# Patient Record
Sex: MALE | Race: WHITE | Employment: UNEMPLOYED | URBAN - METROPOLITAN AREA
[De-identification: names, ages, dates, MRNs, and addresses within clinical notes are randomized per-mention and may not be internally consistent; named-entity substitution may affect disease eponyms.]

---

## 2018-04-17 ENCOUNTER — HOSPITAL ENCOUNTER (OUTPATIENT)
Dept: OTHER | Age: 43
Discharge: HOME | End: 2018-04-17
Attending: NURSE PRACTITIONER | Admitting: CLINIC/CENTER

## 2018-04-18 LAB
RAPID INFLUENZA  B AGN: NEGATIVE
RAPID INFLUENZA A AGN: NEGATIVE

## 2018-10-18 ENCOUNTER — HOSPITAL ENCOUNTER (EMERGENCY)
Age: 43
Discharge: HOME | End: 2018-10-19
Attending: EMERGENCY MEDICINE

## 2018-10-18 DIAGNOSIS — K08.89 PAIN, DENTAL: Primary | ICD-10-CM

## 2018-10-18 DIAGNOSIS — K02.9 PAIN DUE TO DENTAL CARIES: ICD-10-CM

## 2018-10-18 DIAGNOSIS — K04.7 DENTAL ABSCESS: ICD-10-CM

## 2018-10-18 PROCEDURE — 99282 EMERGENCY DEPT VISIT SF MDM: CPT

## 2018-10-18 ASSESSMENT — PAIN DESCRIPTION - ORIENTATION: ORIENTATION: RIGHT

## 2018-10-18 ASSESSMENT — PAIN DESCRIPTION - PAIN TYPE: TYPE: ACUTE PAIN

## 2018-10-18 ASSESSMENT — PAIN DESCRIPTION - LOCATION: LOCATION: FACE

## 2018-10-18 ASSESSMENT — PAIN SCALES - GENERAL: PAINLEVEL_OUTOF10: 5

## 2018-10-19 VITALS
RESPIRATION RATE: 18 BRPM | WEIGHT: 220 LBS | TEMPERATURE: 97.1 F | OXYGEN SATURATION: 98 % | HEIGHT: 76 IN | SYSTOLIC BLOOD PRESSURE: 133 MMHG | BODY MASS INDEX: 26.79 KG/M2 | DIASTOLIC BLOOD PRESSURE: 70 MMHG | HEART RATE: 74 BPM

## 2018-10-19 PROCEDURE — 6370000000 HC RX 637 (ALT 250 FOR IP): Performed by: EMERGENCY MEDICINE

## 2018-10-19 RX ORDER — TRAMADOL HYDROCHLORIDE 50 MG/1
50 TABLET ORAL ONCE
Status: COMPLETED | OUTPATIENT
Start: 2018-10-19 | End: 2018-10-19

## 2018-10-19 RX ORDER — CHLORHEXIDINE GLUCONATE 0.12 MG/ML
15 RINSE ORAL 2 TIMES DAILY
Qty: 420 ML | Refills: 0 | Status: SHIPPED | OUTPATIENT
Start: 2018-10-19 | End: 2018-11-02

## 2018-10-19 RX ORDER — TRAMADOL HYDROCHLORIDE 50 MG/1
50 TABLET ORAL EVERY 6 HOURS PRN
Qty: 15 TABLET | Refills: 0 | Status: SHIPPED | OUTPATIENT
Start: 2018-10-19 | End: 2018-10-29

## 2018-10-19 RX ORDER — AMOXICILLIN 500 MG/1
500 CAPSULE ORAL ONCE
Status: COMPLETED | OUTPATIENT
Start: 2018-10-19 | End: 2018-10-19

## 2018-10-19 RX ORDER — AMOXICILLIN 500 MG/1
500 CAPSULE ORAL 3 TIMES DAILY
Qty: 30 CAPSULE | Refills: 0 | Status: SHIPPED | OUTPATIENT
Start: 2018-10-19 | End: 2018-10-29

## 2018-10-19 RX ADMIN — TRAMADOL HYDROCHLORIDE 50 MG: 50 TABLET, FILM COATED ORAL at 00:15

## 2018-10-19 RX ADMIN — AMOXICILLIN 500 MG: 500 CAPSULE ORAL at 00:15

## 2019-03-31 ENCOUNTER — HOSPITAL ENCOUNTER (EMERGENCY)
Age: 44
Discharge: HOME | End: 2019-04-01
Attending: EMERGENCY MEDICINE

## 2019-03-31 DIAGNOSIS — F10.920 ACUTE ALCOHOLIC INTOXICATION WITHOUT COMPLICATION (HCC): Primary | ICD-10-CM

## 2019-03-31 LAB
ACETAMINOPHEN LEVEL: <5 UG/ML (ref 15–30)
ALBUMIN SERPL-MCNC: 4.7 GM/DL (ref 3.4–5)
ALCOHOL SCREEN SERUM: 0.1 %WT/VOL
ALCOHOL SCREEN SERUM: 0.27 %WT/VOL
ALP BLD-CCNC: 66 IU/L (ref 40–129)
ALT SERPL-CCNC: 14 U/L (ref 10–40)
AMPHETAMINES: NEGATIVE
ANION GAP SERPL CALCULATED.3IONS-SCNC: 16 MMOL/L (ref 4–16)
AST SERPL-CCNC: 19 IU/L (ref 15–37)
BACTERIA: NEGATIVE /HPF
BARBITURATE SCREEN URINE: NEGATIVE
BASOPHILS ABSOLUTE: 0.1 K/CU MM
BASOPHILS RELATIVE PERCENT: 0.5 % (ref 0–1)
BENZODIAZEPINE SCREEN, URINE: NEGATIVE
BILIRUB SERPL-MCNC: 0.3 MG/DL (ref 0–1)
BILIRUBIN URINE: NEGATIVE MG/DL
BLOOD, URINE: NEGATIVE
BUN BLDV-MCNC: 10 MG/DL (ref 6–23)
CALCIUM SERPL-MCNC: 8.6 MG/DL (ref 8.3–10.6)
CANNABINOID SCREEN URINE: ABNORMAL
CHLORIDE BLD-SCNC: 106 MMOL/L (ref 99–110)
CLARITY: CLEAR
CO2: 21 MMOL/L (ref 21–32)
COCAINE METABOLITE: NEGATIVE
COLOR: ABNORMAL
CREAT SERPL-MCNC: 0.9 MG/DL (ref 0.9–1.3)
DIFFERENTIAL TYPE: ABNORMAL
DOSE AMOUNT: ABNORMAL
DOSE AMOUNT: ABNORMAL
DOSE TIME: ABNORMAL
DOSE TIME: ABNORMAL
EOSINOPHILS ABSOLUTE: 0.1 K/CU MM
EOSINOPHILS RELATIVE PERCENT: 0.7 % (ref 0–3)
GFR AFRICAN AMERICAN: >60 ML/MIN/1.73M2
GFR NON-AFRICAN AMERICAN: >60 ML/MIN/1.73M2
GLUCOSE BLD-MCNC: 128 MG/DL (ref 70–99)
GLUCOSE, URINE: NEGATIVE MG/DL
HCT VFR BLD CALC: 51.3 % (ref 42–52)
HEMOGLOBIN: 17.2 GM/DL (ref 13.5–18)
IMMATURE NEUTROPHIL %: 0.3 % (ref 0–0.43)
KETONES, URINE: NEGATIVE MG/DL
LEUKOCYTE ESTERASE, URINE: NEGATIVE
LYMPHOCYTES ABSOLUTE: 3.7 K/CU MM
LYMPHOCYTES RELATIVE PERCENT: 35.4 % (ref 24–44)
MCH RBC QN AUTO: 30.2 PG (ref 27–31)
MCHC RBC AUTO-ENTMCNC: 33.5 % (ref 32–36)
MCV RBC AUTO: 90.2 FL (ref 78–100)
MONOCYTES ABSOLUTE: 0.9 K/CU MM
MONOCYTES RELATIVE PERCENT: 8.3 % (ref 0–4)
MUCUS: ABNORMAL HPF
NITRITE URINE, QUANTITATIVE: NEGATIVE
NUCLEATED RBC %: 0 %
OPIATES, URINE: NEGATIVE
OXYCODONE: ABNORMAL
PDW BLD-RTO: 13.1 % (ref 11.7–14.9)
PH, URINE: 6 (ref 5–8)
PHENCYCLIDINE, URINE: NEGATIVE
PLATELET # BLD: 275 K/CU MM (ref 140–440)
PMV BLD AUTO: 9.2 FL (ref 7.5–11.1)
POTASSIUM SERPL-SCNC: 3.4 MMOL/L (ref 3.5–5.1)
PROTEIN UA: NEGATIVE MG/DL
RBC # BLD: 5.69 M/CU MM (ref 4.6–6.2)
RBC URINE: ABNORMAL /HPF (ref 0–3)
SALICYLATE LEVEL: <0.3 MG/DL (ref 15–30)
SEGMENTED NEUTROPHILS ABSOLUTE COUNT: 5.7 K/CU MM
SEGMENTED NEUTROPHILS RELATIVE PERCENT: 54.8 % (ref 36–66)
SODIUM BLD-SCNC: 143 MMOL/L (ref 135–145)
SPECIFIC GRAVITY UA: 1.01 (ref 1–1.03)
TOTAL IMMATURE NEUTOROPHIL: 0.03 K/CU MM
TOTAL NUCLEATED RBC: 0 K/CU MM
TOTAL PROTEIN: 7.8 GM/DL (ref 6.4–8.2)
TRICHOMONAS: ABNORMAL /HPF
UROBILINOGEN, URINE: NORMAL MG/DL (ref 0.2–1)
WBC # BLD: 10.3 K/CU MM (ref 4–10.5)
WBC UA: <1 /HPF (ref 0–2)

## 2019-03-31 PROCEDURE — 80307 DRUG TEST PRSMV CHEM ANLYZR: CPT

## 2019-03-31 PROCEDURE — 85025 COMPLETE CBC W/AUTO DIFF WBC: CPT

## 2019-03-31 PROCEDURE — 96372 THER/PROPH/DIAG INJ SC/IM: CPT

## 2019-03-31 PROCEDURE — 36415 COLL VENOUS BLD VENIPUNCTURE: CPT

## 2019-03-31 PROCEDURE — 80053 COMPREHEN METABOLIC PANEL: CPT

## 2019-03-31 PROCEDURE — 6360000002 HC RX W HCPCS: Performed by: PHYSICIAN ASSISTANT

## 2019-03-31 PROCEDURE — 99285 EMERGENCY DEPT VISIT HI MDM: CPT

## 2019-03-31 PROCEDURE — G0480 DRUG TEST DEF 1-7 CLASSES: HCPCS

## 2019-03-31 PROCEDURE — 81001 URINALYSIS AUTO W/SCOPE: CPT

## 2019-03-31 RX ORDER — HALOPERIDOL 5 MG/ML
5 INJECTION INTRAMUSCULAR ONCE
Status: COMPLETED | OUTPATIENT
Start: 2019-03-31 | End: 2019-03-31

## 2019-03-31 RX ORDER — ZIPRASIDONE MESYLATE 20 MG/ML
10 INJECTION, POWDER, LYOPHILIZED, FOR SOLUTION INTRAMUSCULAR ONCE
Status: COMPLETED | OUTPATIENT
Start: 2019-03-31 | End: 2019-03-31

## 2019-03-31 RX ADMIN — HALOPERIDOL LACTATE 5 MG: 5 INJECTION, SOLUTION INTRAMUSCULAR at 16:24

## 2019-03-31 RX ADMIN — ZIPRASIDONE MESYLATE 10 MG: 20 INJECTION, POWDER, LYOPHILIZED, FOR SOLUTION INTRAMUSCULAR at 15:44

## 2019-03-31 NOTE — ED TRIAGE NOTES
Patient came into ED today for suicidal ideation. Patient's wife left and patient has wanted to kill himself. No plans. No weapons on him. No homicidal ideation. Patient has been drinking for more than 24 hours. Patient smokes weed, no other drug use.

## 2019-03-31 NOTE — ED PROVIDER NOTES
Emergency Department Encounter  Location: [address] [place] [address]    Patient: Dameon Gill  MRN: [id number]  : 1975  Date of evaluation: 3/31/2019  ED Provider: Nestor Couch MD    8:00p.m. Dameon Gill was checked out to me by BECKY Melton . Please see his/her initial documentation for details of the patient's initial ED presentation, physical exam and completed studies. In brief, Dameon Gill is a 38 y.o. male that presented to the emergency department  Presenting with alcohol intoxication and agitation. Reporting anxiety. Brought in by [facility]. Made threats of hurting himself and wanting to \"end it all\". Given Geodon beause he tried to leave. Alcohol repeat at 11pm. Pink slipped by PA because of suicidal complaints. Lives with teenage son and 10year old. No weapons at home.         I have reviewed and interpreted all of the currently available lab results and diagnostics from this visit:  Results for orders placed or performed during the hospital encounter of 19   Ethanol Level   Result Value Ref Range    Alcohol Scrn 0.27 (H) <0.01 %WT/VOL   CMP   Result Value Ref Range    Sodium 143 135 - 145 MMOL/L    Potassium 3.4 (L) 3.5 - 5.1 MMOL/L    Chloride 106 99 - 110 mMol/L    CO2 21 21 - 32 MMOL/L    BUN 10 6 - 23 MG/DL    CREATININE 0.9 0.9 - 1.3 MG/DL    Glucose 128 (H) 70 - 99 MG/DL    Calcium 8.6 8.3 - 10.6 MG/DL    Alb 4.7 3.4 - 5.0 GM/DL    Total Protein 7.8 6.4 - 8.2 GM/DL    Total Bilirubin 0.3 0.0 - 1.0 MG/DL    ALT 14 10 - 40 U/L    AST 19 15 - 37 IU/L    Alkaline Phosphatase 66 40 - 129 IU/L    GFR Non-African American >60 >60 mL/min/1.73m2    GFR African American >60 >60 mL/min/1.73m2    Anion Gap 16 4 - 16   CBC auto diff   Result Value Ref Range    WBC 10.3 4.0 - 10.5 K/CU MM    RBC 5.69 4.6 - 6.2 M/CU MM    Hemoglobin 17.2 13.5 - 18.0 GM/DL    Hematocrit 51.3 42 - 52 %    MCV 90.2 78 - 100 FL    MCH 30.2 27 - 31 PG    MCHC 33.5 32.0 - recognition program. Efforts were made to edit the dictations but occasionally words are mis-transcribed.)    Luna Benton MD  [address], MD  04/01/19 1011

## 2019-03-31 NOTE — ED PROVIDER NOTES
eMERGENCY dEPARTMENT eNCOUnter      PCP: No primary care provider on file. CHIEF COMPLAINT    Chief Complaint   Patient presents with    Suicidal     Patient's wife left and patient has wanted to kill himself. No plans. No weapons on him. No homicidal ideation.  Alcohol Intoxication     Patient has been drinking for more than 24 hours. Patient smokes weed, no other drug use. HPI    Iglesia Spencer is a 38 y.o. male who presents to [facility] for alcohol intoxication and suicidal ideation. Context is patient states that he has been treated steadily for the last 24 hours, admitting to both beer and hard liquor use. States that his wife left him a day ago and \"I just want to end it all. \"  I was not able to speak SPD the patient believes either his mother or sister called them given his suicidal vocalizations. Patient is currently living in the apartment with his teenage son and 10year-old son. Denies any weapons in the household. No homicidal ideations or visual/auditory hallucinations. States that he has had lifelong issues with depression and anxiety but has never been diagnosed with psychiatric condition and has never been evaluated by mental health. He does report that he has had thoughts of hurting himself in the past but does not currently have a plan. Does appear acutely intoxicated. Admits to marijuana use today but no other illicit drug use. REVIEW OF SYSTEMS    Psychiatric: See HPI. General: No fevers or chills  Cardiac:  No chest pain or palpitations  Respiratory: No difficulty breathing or new cough  Neurologic: No Headache or syncope  GI: No vomiting or diarrhea  : No dysuria or hematuria  See HPI for further details. All other systems reviewed and are negative. PAST MEDICAL & SURGICALHISTORY    History reviewed. No pertinent past medical history.   Past Surgical History:   Procedure Laterality Date    BACK SURGERY      L3 & L6       CURRENT MEDICATIONS        ALLERGIES    No Known Allergies    SOCIAL & FAMILYHISTORY    Social History     Socioeconomic History    Marital status:      Spouse name: None    Number of children: None    Years of education: None    Highest education level: None   Occupational History    None   Social Needs    Financial resource strain: None    Food insecurity:     Worry: None     Inability: None    Transportation needs:     Medical: None     Non-medical: None   Tobacco Use    Smoking status: Never Smoker    Smokeless tobacco: Current User     Types: Chew   Substance and Sexual Activity    Alcohol use: Yes     Alcohol/week: 0.0 oz     Comment: social    Drug use: No    Sexual activity: Yes   Lifestyle    Physical activity:     Days per week: None     Minutes per session: None    Stress: None   Relationships    Social connections:     Talks on phone: None     Gets together: None     Attends [employer] service: None     Active member of club or organization: None     Attends meetings of clubs or organizations: None     Relationship status: None    Intimate partner violence:     Fear of current or ex partner: None     Emotionally abused: None     Physically abused: None     Forced sexual activity: None   Other Topics Concern    None   Social History Narrative    None     History reviewed. No pertinent family history. PHYSICAL EXAM    VITAL SIGNS: /78   Pulse 92   Temp 97.9 °F (36.6 °C) (Oral)   Resp 16   SpO2 100%   Constitutional:  Well developed, well nourished, no acute distress. Patient is yelling loudly, belligerent and acutely intoxicated  Eyes:  EOMI. PERRL, conjunctiva injected  HENT:  Atraumatic, external ears normal, nose normal   Neck/Lymphatics: supple, no JVD, no swollen nodes. No thyromegaly or thyroid nodules.   Respiratory:  No respiratory distress, normal breath sounds  Cardiovascular:  Normal rate, normal rhythm, no murmurs  GI:  Soft, nondistended, normal bowel sounds, nontender  Musculoskeletal:  No edema, no acute deformities   Integument:  Well hydrated   Neurologic:  Awake alert and oriented acutely intoxicated. He was ambulatory but with an unsteady gait upon ED arrival from triage  Psychiatric:  Belligerent, yelling loudly throughout encounter, stating \"I want to end it all\" no plan. Becomes more combative and agitated when wife comes to the emergency department. No homicidal ideation. No hallucinations.   No flight of ideas but patient has frequent tangential speech       LABS:  Results for orders placed or performed during the hospital encounter of 03/31/19   Ethanol Level   Result Value Ref Range    Alcohol Scrn 0.27 (H) <0.01 %WT/VOL   CMP   Result Value Ref Range    Sodium 143 135 - 145 MMOL/L    Potassium 3.4 (L) 3.5 - 5.1 MMOL/L    Chloride 106 99 - 110 mMol/L    CO2 21 21 - 32 MMOL/L    BUN 10 6 - 23 MG/DL    CREATININE 0.9 0.9 - 1.3 MG/DL    Glucose 128 (H) 70 - 99 MG/DL    Calcium 8.6 8.3 - 10.6 MG/DL    Alb 4.7 3.4 - 5.0 GM/DL    Total Protein 7.8 6.4 - 8.2 GM/DL    Total Bilirubin 0.3 0.0 - 1.0 MG/DL    ALT 14 10 - 40 U/L    AST 19 15 - 37 IU/L    Alkaline Phosphatase 66 40 - 129 IU/L    GFR Non-African American >60 >60 mL/min/1.73m2    GFR African American >60 >60 mL/min/1.73m2    Anion Gap 16 4 - 16   CBC auto diff   Result Value Ref Range    WBC 10.3 4.0 - 10.5 K/CU MM    RBC 5.69 4.6 - 6.2 M/CU MM    Hemoglobin 17.2 13.5 - 18.0 GM/DL    Hematocrit 51.3 42 - 52 %    MCV 90.2 78 - 100 FL    MCH 30.2 27 - 31 PG    MCHC 33.5 32.0 - 36.0 %    RDW 13.1 11.7 - 14.9 %    Platelets 652 104 - 901 K/CU MM    MPV 9.2 7.5 - 11.1 FL    Differential Type AUTOMATED DIFFERENTIAL     Segs Relative 54.8 36 - 66 %    Lymphocytes % 35.4 24 - 44 %    Monocytes % 8.3 (H) 0 - 4 %    Eosinophils % 0.7 0 - 3 %    Basophils % 0.5 0 - 1 %    Segs Absolute 5.7 K/CU MM    Lymphocytes # 3.7 K/CU MM    Monocytes # 0.9 K/CU MM    Eosinophils # 0.1 K/CU MM    Basophils # 0.1 K/CU MM    Nucleated RBC % 0.0 %    Total Nucleated RBC 0.0 K/CU MM    Total Immature Neutrophil 0.03 K/CU MM    Immature Neutrophil % 0.3 0 - 0.43 %   Acetaminophen Level   Result Value Ref Range    Acetaminophen Level <5.0 (L) 15 - 30 ug/ml    DOSE AMOUNT DOSE AMT. GIVEN - UNKNOWN     DOSE TIME DOSE TIME GIVEN - UNKNOWN    Salicylate   Result Value Ref Range    Salicylate Lvl <8.7 (L) 15 - 30 MG/DL    DOSE AMOUNT DOSE AMT. GIVEN - UNKNOWN     DOSE TIME DOSE TIME GIVEN - UNKNOWN    Urinalysis   Result Value Ref Range    Color, UA STRAW (A) YELLOW    Clarity, UA CLEAR CLEAR    Glucose, Urine NEGATIVE NEGATIVE MG/DL    Bilirubin Urine NEGATIVE NEGATIVE MG/DL    Ketones, Urine NEGATIVE NEGATIVE MG/DL    Specific Gravity, UA 1.006 1.001 - 1.035    Blood, Urine NEGATIVE NEGATIVE    pH, Urine 6.0 5.0 - 8.0    Protein, UA NEGATIVE NEGATIVE MG/DL    Urobilinogen, Urine NORMAL 0.2 - 1.0 MG/DL    Nitrite Urine, Quantitative NEGATIVE NEGATIVE    Leukocyte Esterase, Urine NEGATIVE NEGATIVE    RBC, UA NONE SEEN 0 - 3 /HPF    WBC, UA <1 0 - 2 /HPF    Bacteria, UA NEGATIVE NEGATIVE /HPF    Mucus, UA RARE (A) NEGATIVE HPF    Trichomonas, UA NONE SEEN NONE SEEN /HPF   Drug screen multi urine   Result Value Ref Range    Cannabinoid Scrn, Ur UNCONFIRMED POSITIVE (A) NEGATIVE    Amphetamines NEGATIVE NEGATIVE    Cocaine Metabolite NEGATIVE NEGATIVE    Benzodiazepine Screen, Urine NEGATIVE NEGATIVE    Barbiturate Screen, Ur NEGATIVE NEGATIVE    Opiates, Urine NEGATIVE NEGATIVE    Phencyclidine, Urine NEGATIVE NEGATIVE    Oxycodone  NEGATIVE     NEGATIVE          THRESHOLD CONCENTRATIONS (mg/dL)  AMPHT               1000  LISE,OPIA             300  BZO,BAR              200  PCP                   25  THC                   50  OXY                  100          IF POSITIVE, SPECIMEN WILL BE  DISCARDED AFTER 6 MONTHS. CALL LAB IF CONFIRMATION NEEDED. ALL NEGATIVE SPECIMENS WILL BE  DISCARDED AFTER ONE WEEK. * UNCONFIRMED POSITIVES MAY  NOT MEET FORENSIC REQUIREMENTS. ED COURSE & MEDICAL DECISION MAKING    Pertinent Labs & Imaging studies reviewed and interpreted. (See chart for details)    Vitals:    03/31/19 1431 03/31/19 1612   BP: (!) 137/97 116/78   Pulse: 88 92   Resp: 16    Temp: 97.9 °F (36.6 °C)    TempSrc: Oral    SpO2: 96% 100%        Vital signs and nursing notes reviewed during ED course. Patient care and presentation staffed with supervising MD - Dr. Franchesca Hernandez. All pertinent Lab data and radiographic results reviewed with patient at bedside. The patient and/or the family were informed of the results of any tests/labs/imaging, the treatment plan, and time was allotted to answer questions. Differential diagnoses: Drug or alcohol use or abuse, psychosis, behavioral mental illness, metabolic disturbance, infection, neurologic emergency, other    Clinical  IMPRESSION    1. Acute alcoholic intoxication without complication ([facility] [address]        Patient presents from home via SPD for alcohol intoxication and suicidal ideation. On exam, patient does appear acutely intoxicated, yelling out frequently during exam.  He does report both to myself and to ED nursing staff that \"I want to end it all\" without plan. No homicidal ideation. Pink slip was filled out, signed and placed into patient's chart. Psych laboratory workup was initiated, positive for cannabinoids and alcohol level is 0.27. At this time, awaiting repeat EtOH level for medical clearance and mental health evaluation. At one point, the patient required IM geodon for combativeness and agitation but then became more combative/agitated  after his wife came to the ED and required additional haldol. At this time, he is requesting comfortably in no acute distress. I did discuss this patient's history, ED presentation/course with my attending physician - Dr. Franchesca Hernandez. Final disposition, clinical impression, interpretation of labs/imaging were made by attending physician.   Please see his/her note for additional details of their evaluation.   (Please note the MDM and HPI sections of this note were completed with a voice recognition program.  Efforts were made to edit the dictations but occasionally words are mis-transcribed.)        Yared Govea PA-C  03/31/19 1914

## 2019-03-31 NOTE — ED NOTES
Bed: ED-27  Expected date: 3/31/19  Expected time: 2:05 PM  Means of arrival:   Comments:  radha Catalan RN  03/31/19 4988

## 2019-04-01 VITALS
TEMPERATURE: 97.9 F | OXYGEN SATURATION: 93 % | HEART RATE: 86 BPM | SYSTOLIC BLOOD PRESSURE: 96 MMHG | DIASTOLIC BLOOD PRESSURE: 53 MMHG | RESPIRATION RATE: 17 BRPM

## 2019-04-01 NOTE — ED NOTES
Report from [facility]. Patient resting in bed with 1:1 sitter at bedside.      Mabel Silverman RN  03/31/19 4425

## 2023-10-20 ENCOUNTER — HOSPITAL ENCOUNTER (OUTPATIENT)
Dept: PSYCHIATRY | Age: 48
Setting detail: THERAPIES SERIES
Discharge: HOME | End: 2023-10-20
Payer: COMMERCIAL

## 2023-10-20 PROCEDURE — 80305 DRUG TEST PRSMV DIR OPT OBS: CPT

## 2023-10-20 PROCEDURE — 90791 PSYCH DIAGNOSTIC EVALUATION: CPT

## 2023-10-20 ASSESSMENT — ANXIETY QUESTIONNAIRES
GAD7 TOTAL SCORE: 3
7. FEELING AFRAID AS IF SOMETHING AWFUL MIGHT HAPPEN: 1
2. NOT BEING ABLE TO STOP OR CONTROL WORRYING: 0
IF YOU CHECKED OFF ANY PROBLEMS ON THIS QUESTIONNAIRE, HOW DIFFICULT HAVE THESE PROBLEMS MADE IT FOR YOU TO DO YOUR WORK, TAKE CARE OF THINGS AT HOME, OR GET ALONG WITH OTHER PEOPLE: NOT DIFFICULT AT ALL
6. BECOMING EASILY ANNOYED OR IRRITABLE: 0
4. TROUBLE RELAXING: 0
3. WORRYING TOO MUCH ABOUT DIFFERENT THINGS: 1
5. BEING SO RESTLESS THAT IT IS HARD TO SIT STILL: 0
1. FEELING NERVOUS, ANXIOUS, OR ON EDGE: 1

## 2023-10-20 ASSESSMENT — PATIENT HEALTH QUESTIONNAIRE - PHQ9
SUM OF ALL RESPONSES TO PHQ QUESTIONS 1-9: 1
2. FEELING DOWN, DEPRESSED OR HOPELESS: 1
1. LITTLE INTEREST OR PLEASURE IN DOING THINGS: 0
SUM OF ALL RESPONSES TO PHQ9 QUESTIONS 1 & 2: 1
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
## 2023-10-24 ENCOUNTER — HOSPITAL ENCOUNTER (OUTPATIENT)
Dept: PSYCHIATRY | Age: 48
Setting detail: THERAPIES SERIES
Discharge: HOME | End: 2023-10-24
Payer: COMMERCIAL

## 2023-10-24 PROCEDURE — 90853 GROUP PSYCHOTHERAPY: CPT

## 2023-10-25 NOTE — GROUP NOTE
[facility] Group Therapy Note      10/24/2023    Location:  [facility]      Clients Presents: [id number], [address], 1048, 1366, [id number], 1043, 1159    Clients Absent:     Length of session: 1.5 hours    Group Note: OP    Group Type: Co-Ed    New members were welcomed and introduced. Norms and expectations of group were discussed. Content: Counselor presented a topic focused discussion on ACE's. Client took ACE test, answering 10 questions (Adverse Childhood Events) to get his/her score. Client participated in group discussion surrounding different types of childhood trauma and how it effected him/her then and now. Telly Barraza, [address] [facility] [place]  10/24/2023 7:00 PM    Co-Therapist: N/A      [facility] Individual Group Progress Note    Christy Gaviria  1975  10/25/2023    Notes on Client Progress in Group  Client shared this is his first group and he feels like he is making progress in remaining sober. He has a medical marijuana card but has not used alcohol. Client participated in group discussion on ACE's, he shared he scored a 5.      Austin Brewer [address]  10/25/2023 7:42 AM    Co-Therapist: N/A

## 2023-10-27 ENCOUNTER — HOSPITAL ENCOUNTER (OUTPATIENT)
Dept: PSYCHIATRY | Age: 48
Setting detail: THERAPIES SERIES
Discharge: HOME | End: 2023-10-27
Payer: COMMERCIAL

## 2023-10-27 PROCEDURE — 90834 PSYTX W PT 45 MINUTES: CPT

## 2023-10-27 NOTE — PROGRESS NOTES
----- Message from Edouard Gibson MD sent at 11/4/2022  5:12 PM CDT -----  Patient provided necessary mobility scooter paperwork. Will need an appointment to have all required fields evaluated. Paperwork is on your desk.    Edouard Gibson MD         [facility] TREATMENT PLAN      Location: [x] [place] [] [facility]    Treatment plan: Initial    Strengths: loyal, reliable and hardworking    Weakness/Limitations: procrastination , impulsive when angry     Service/Frequency/Duration: Individual 1 time a week for 90 days, Group assigned 1 a week for 90 days, Urinalysis Random for 90 days, AA/NA 1-2 Biweekly for 90 days, and Case Management as needed for 90 days    Diagnosis: F10.20 Other and unspecified alcohol dependence/unspecified drinking behavior and F12.10 Nondependent cannabis abuse-unspecified use    Level of Care: 1 Outpatient Services      Problem: History of Substance use resulting disrupting public service. Goal: Client will enhance personalized knowledge and insight associated with mood altering substances in 90 days   Objectives:   1) Client will remind self of detrimental consequences in major life areas regarding AoD use in 90 days Evaluation Date: 1/27/2024 Code: C Continue TBD     2) Client will identify 4 to 8 benefits and gratitude's due to remaining substance free in 90 days: Evaluation Date: 1/27/2024 Code: C Continue TBD     3) Client will identify 4 relapse triggers, define relapse, difference between internal and external triggers associated with substance use in 90 days and Evaluation Date: 1/27/2024 Code: C Continue TBD     2. Problem: Low Self-Esteem/Identify issues as a result of self-medicating.       Goal: Client will learn to focus on character strengths and feel better about himself in 90 days      Objectives:   1) Client will log 3-5 times weekly thoughts and feelings and share in his individual sessions in 90 days:  Evaluation Date: 1/27/2024 Code: C Continue TBD      2) Client will explore new outdoor/indoor activities that bring him estephania in 90 days Evaluation Date: 1/27/2024 Code: C Continue TBD      3)  Utilize, if needed case management services provided by [employer] to enhance abstaining from substance use Evaluation
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
## 2023-10-31 ENCOUNTER — APPOINTMENT (OUTPATIENT)
Dept: PSYCHIATRY | Age: 48
End: 2023-10-31
Payer: COMMERCIAL

## 2023-10-31 PROCEDURE — 90853 GROUP PSYCHOTHERAPY: CPT

## 2023-11-01 NOTE — GROUP NOTE
[facility] Group Therapy Note      10/31/2023    Location:  [place]      Clients Presents: [id number], [id number], [id number], [id number], [id number]    Clients Absent: 1043, 1159    Length of session: 1.5 hours    Group Note: OP    Group Type: Co-Ed    New members were welcomed and introduced. Norms and expectations of group were discussed. Content: Counselor presented a topic focused discussion on changing behaviors. Client identified a behavior he/she wants to change. Client identified steps to take in order to make the change. Austin Chopra  10/31/2023 7:00 PM    Co-Therapist: N/A      [facility] Individual Group Progress Note    Judy Banda  1975 11/1/2023    Notes on Client Progress in Group    Client shared he is making progress on his goals and remaining sober except his medical marijuana. Client participated in group discussion on changing behaviors reporting he would like to continue to pause when he feels strong emotions and take a time out.      Austin Chopra  11/1/2023 7:48 AM    Co-Therapist: N/A

## 2023-11-03 ENCOUNTER — HOSPITAL ENCOUNTER (OUTPATIENT)
Dept: PSYCHIATRY | Age: 48
Setting detail: THERAPIES SERIES
Discharge: HOME | End: 2023-11-03
Payer: COMMERCIAL

## 2023-11-03 PROCEDURE — 90834 PSYTX W PT 45 MINUTES: CPT

## 2023-11-03 NOTE — PROGRESS NOTES
[facility] TREATMENT PLAN      Location: [x] [place] [] [facility]    Treatment plan: Initial    Strengths: loyal, reliable and hardworking    Weakness/Limitations: procrastination , impulsive when angry     Service/Frequency/Duration: Individual 1 time a week for 90 days, Group assigned 1 a week for 90 days, Urinalysis Random for 90 days, AA/NA 1-2 Biweekly for 90 days, and Case Management as needed for 90 days    Diagnosis: F10.20 Other and unspecified alcohol dependence/unspecified drinking behavior and F12.10 Nondependent cannabis abuse-unspecified use    Level of Care: 1 Outpatient Services      Problem: History of Substance use resulting disrupting public service. Goal: Client will enhance personalized knowledge and insight associated with mood altering substances in 90 days   Objectives:   1) Client will remind self of detrimental consequences in major life areas regarding AoD use in 90 days Evaluation Date: 1/27/2024 Code: Achieved 11/3/2023 Client reports he neglected his kids, his family, drinking created depression, he spent tons of money, and lost people and friendships. 2) Client will identify 4 to 8 benefits and gratitude's due to remaining substance free in 90 days: Evaluation Date: 1/27/2024 Code: C Continue TBD     3) Client will identify 4 relapse triggers, define relapse, difference between internal and external triggers associated with substance use in 90 days and Evaluation Date: 1/27/2024 Code: C Continue TBD     2. Problem: Low Self-Esteem/Identify issues as a result of self-medicating.       Goal: Client will learn to focus on character strengths and feel better about himself in 90 days      Objectives:   1) Client will log 3-5 times weekly thoughts and feelings and share in his individual sessions in 90 days:  Evaluation Date: 1/27/2024 Code: C Continue TBD      2) Client will explore new outdoor/indoor activities that bring him estephania in 90 days Evaluation Date: 1/27/2024 Code: C

## 2023-11-03 NOTE — PROGRESS NOTES
500 [facility]        Individual  Progress Note    Location: [x] [place] [] [facility]                   Patient Name: Radha Alexander   : 1975     Case # :  [id number]  Therapist: CARMELA Dillon        Objective/Service/Time: kept 1 hour individual     Goal 1 Objective 1 achieved    S-Client is a 53year old  male on probation. Client denies any use or cravings. He is still utilizing his medical marijuana. Client shared he has had a couple of breakthroughs stating, \"I have to tell you about Wednesday night. We were on our way to counseling and my wife's truck broke down on the highway. She said something that hurt my feelings and I didn't react. I though to myself just pause and take a time out. I thought about it and after a day I talked to her calmly and told her how I felt and it went really well. I was so surprised. I am learning new tools and it really works\". Client shared on the negative effects of his use sharing it effected all his relationships, he neglected his children, drinking created depression, he spent too much money and he lost people and friends ships over his use. Client denies any current stressors. O-Client was cooperative, pleasant and oriented x 4. He shared his thoughts and feelings openly. A-Client has good insight into his AoD use and consequences. He is in the contemplation stage of change. He has good family support and is seeking outside help in the form of marriage counseling. Counselor encouraged journaling and client reports he is buying a journal tomorrow. P-Continue services, journal and build support.          Electronically signed by Baron Gonsales on 11/3/2023 at 10:49 AM

## 2023-11-07 ENCOUNTER — HOSPITAL ENCOUNTER (OUTPATIENT)
Dept: PSYCHIATRY | Age: 48
Setting detail: THERAPIES SERIES
Discharge: HOME | End: 2023-11-07
Payer: COMMERCIAL

## 2023-11-07 PROCEDURE — 90853 GROUP PSYCHOTHERAPY: CPT

## 2023-11-08 NOTE — GROUP NOTE
500 [facility] Group Therapy Note      11/7/2023    Location:  [place]      Clients Presents: 5784, 6361, 1373, 1531, 1509, 1043    Clients Absent:     Length of session: 1.5 hours    Group Note: OP    Group Type: Co-Ed    New members were welcomed and introduced. Norms and expectations of group were discussed. Content: Counselor presented a topic focused discussion on toxic people and their traits. Client identified toxic people in his/her life and healthy boundaries that need to be set with toxic people. Austin Birch [address]  11/7/2023 7:00 PM    Co-Therapist: N/A      [facility] Individual Group Progress Note    Nicolas Real  1975 11/8/2023    Notes on Client Progress in Group    Client shared he is making progress on his goals in treatment. He shared how he overcame a couple of situations this past weekend. He stated, \"II am proud of myself\". Client participated in group discussion on toxic people reporting he felt like he was the toxic person and he is changing himself.       Olivia Birch0 [address]  11/8/2023 7:31 AM    Co-Therapist: N/A

## 2023-11-10 ENCOUNTER — HOSPITAL ENCOUNTER (OUTPATIENT)
Dept: PSYCHIATRY | Age: 48
Setting detail: THERAPIES SERIES
Discharge: HOME | End: 2023-11-10
Payer: COMMERCIAL

## 2023-11-10 PROCEDURE — 90834 PSYTX W PT 45 MINUTES: CPT

## 2023-11-10 NOTE — PROGRESS NOTES
500 [facility]        Individual  Progress Note    Location: [x] [place] [] [facility]                   Patient Name: Ramonita Jaeger   : 1975     Case # :  [id number]  Therapist: TABATHA PereiraIII        Objective/Service/Time: kept 1 hour individual     Goal 1 Objective 3 achieved    S-Client is a 53year old  male on probation. Client denies any use except his medical marijuana. Client shared he has had a great week stating, 'I had a triggering event this week that I had success with. My wife and I were talking and she mentioned a shanna she was working with and that triggered a memory of years ago when she had an affair with a shanna from work. I didn't say anything to her. I paused. I thought about my feelings and the next day I told her what happened and we talked about it and it went really well. I can't believe how well I am doing with managing my emotions\". Client shared on other triggers sharing his triggers are emotional, grief and loss and anger. He reports Holiday's are a trigger due to the family members who will not be there due to they have . O-Client was cooperative, pleasant and oriented x 4. A-Client has good insight into his AoD use and triggers. He is in the action stage of change and is looking into attending AA/NA meetings. Client and wife are still attending counseling Wed. Evenings. Client has small support network and has been encouraged to build more. Client is eager to try any suggestions given.      P-Client will continue services, attend 12 step meetings, journal.         Electronically signed by uAstin Pereira on 11/10/2023 at 10:39 AM

## 2023-11-14 ENCOUNTER — HOSPITAL ENCOUNTER (OUTPATIENT)
Dept: PSYCHIATRY | Age: 48
Setting detail: THERAPIES SERIES
Discharge: HOME | End: 2023-11-14
Payer: COMMERCIAL

## 2023-11-15 NOTE — GROUP NOTE
500 [facility] Group Therapy Note      11/14/2023    Location:  [place]      Clients Presents: 7944, 5126, 1532, 1373, 1509, 1043    Clients Absent: 1531    Length of session: 1.5 hours    Group Note: OP    Group Type: Co-Ed    New members were welcomed and introduced. Norms and expectations of group were discussed. Content: Counselor presented a topic focused discussion on Positive thinking. Client identified 6 Just for today sayings that could be said daily to help with staying positive in early recovery.      Austin Washington  11/14/2023 7:00 PM    Co-Therapist: N/A      [facility] Individual Group Progress Note    Fang Viera  1975  11/15/2023    Notes on Client Progress in Group    Reason for Absence: cancelled    Austin Washington [address]  11/15/2023 7:42 AM    Co-Therapist: N/A

## 2023-11-17 ENCOUNTER — HOSPITAL ENCOUNTER (OUTPATIENT)
Dept: PSYCHIATRY | Age: 48
Setting detail: THERAPIES SERIES
Discharge: HOME | End: 2023-11-17
Payer: COMMERCIAL

## 2023-11-17 PROCEDURE — 90834 PSYTX W PT 45 MINUTES: CPT

## 2023-11-17 NOTE — PROGRESS NOTES
500 [facility]        Individual  Progress Note    Location: [x] [place] [] [facility]                   Patient Name: Avril Cortes   : 1975     Case # :  [id number]  Therapist: CARMELA Kyle        Objective/Service/Time: kept 1 hour Telehealth individual     This client has stated his name, the last 4 of SS #, that he is in a confidential location and that he is willing to participate in a Tele-Health individual session. Goal 1 Objective 2 continue    S-Client is a 53year old  male on probation. Client denies any use except his medical marijuana. Client stated, \"I feel so tired and achy today. I couldn't even get out of bed yesterday. My mom took a COVID test last Thursday and it was positive. I probably have it too. I have never been this tired and I am out of breath\". Client shared he had a good marriage counseling session last Wednesday stating, \"It got really heavy. I shared about the death of my ex wife and she shared about the death of her sister. I also shared about my infidelity. I was able to control my emotions but it was defiantly heavy\". Client also shared his wife made a comment that she has noticed a change in his behavior and complimented him. He stated, 'I am proud of myself for the growth and now that she is seeing it I feel really proud\". O-Client was cooperative, pleasant and oriented x 4. A-Client has some insight into his AoD use and triggers. Client voiced his progress in not reacting to his emotions and his ability in using coping skills to be able to respond appropriately. Client is in the action stage of change. P-Continue services.          Electronically signed by Rogelio Ma on 2023 at 10:35 AM

## 2023-11-21 ENCOUNTER — HOSPITAL ENCOUNTER (OUTPATIENT)
Dept: PSYCHIATRY | Age: 48
Setting detail: THERAPIES SERIES
Discharge: HOME | End: 2023-11-21
Payer: COMMERCIAL

## 2023-11-21 PROCEDURE — 90853 GROUP PSYCHOTHERAPY: CPT

## 2023-11-22 NOTE — GROUP NOTE
500 [facility] Group Therapy Note      11/21/2023    Location:  [place]      Clients Presents: 5565, 8980, 1538, 1524, 1532, 1373, 1531, 1509, 1043    Clients Absent:     Length of session: 1.5 hours    Group Note: OP    Group Type: Co-Ed    New members were welcomed and introduced. Norms and expectations of group were discussed. Content: Counselor facilitated a topic focused discussion on the effects of alcohol on the human body. Client identified his/her alcohol history and shared any negative effects experienced with alcohol. Austin Stanley  11/21/2023 7:00 PM    Co-Therapist: N/A      [facility] Individual Group Progress Note    Ben Matta  1975 11/22/2023    Notes on Client Progress in Group    Client shared he is making progress on his goals and shared he was triggered on Wednesday last week after marriage counseling. He wanted to get drunk stating, \"It was heavy. Marriage counseling was brutal. I wanted to get drunk. I didn't but I wanted to. I was sick and not feeling well and counseling was emotional.   Client participated in group discussion on the effects of alcohol on the body. I am so glad I didn't drink.      Austin Stanley  11/22/2023 9:14 AM    Co-Therapist: N/A

## 2023-11-28 ENCOUNTER — APPOINTMENT (OUTPATIENT)
Dept: PSYCHIATRY | Age: 48
End: 2023-11-28
Payer: COMMERCIAL

## 2023-11-28 PROCEDURE — 90853 GROUP PSYCHOTHERAPY: CPT

## 2023-11-29 NOTE — GROUP NOTE
500 [facility] Group Therapy Note      11/28/2023    Location:  [place]      Clients Presents: 1285, [address], [phone], 2299, 1043    Clients Absent:     Length of session: 1.5 hours    Group Note: OP    Group Type: Co-Ed    New members were welcomed and introduced. Norms and expectations of group were discussed. Content: Counselor facilitated client check in information. Counselor presented a solution focused discussion on relapse prevention planning. Austin Castro [address]  11/28/2023 7:00 PM    Co-Therapist: N/A      [facility] Individual Group Progress Note    Juanymandy Parham  1975 11/29/2023    Notes on Client Progress in Group    Client shared he had an emotional Thanksgiving and spent a little time at the [address] [place] [address] Dr with his sister. He felt like it was helpful. Client denies any use and is using his coping skills. Client participated in group discussion on relapse prevention and shared he listens to music, talks to his wife and is writing gratitude lists every morning.      Austin Castro  11/29/2023 9:23 AM    Co-Therapist: N/A

## 2023-12-01 ENCOUNTER — HOSPITAL ENCOUNTER (OUTPATIENT)
Dept: PSYCHIATRY | Age: 48
Setting detail: THERAPIES SERIES
Discharge: HOME | End: 2023-12-01
Payer: COMMERCIAL

## 2023-12-01 PROCEDURE — 90832 PSYTX W PT 30 MINUTES: CPT

## 2023-12-01 NOTE — PROGRESS NOTES
500 [facility]        Individual  Progress Note    Location: [x] [place] [] [facility]                   Patient Name: Paty Kunz   : 1975     Case # :  [id number]  Therapist: CARMELA Jenkins        Objective/Service/Time: kept . 5 Telehealth individual     This client has stated his name, the last 4 of SS #, that he is in a confidential location and that he is willing to participate in a Tele-Health individual session. Goal 2 Objective 1 continue    S-Client is a 53year old  male on probation. Client denies any use except his medical marijuana. Client shared he is not in the office this morning due to heading to [place] t see his nephew play in a [employer] football game stating, \"I am so excited. My mom, my wife, my kids and I are all heading to [facility] to see my nephew. It is going to be great. I told him anjel it doesn't even matter if you all win tonight. You have made me so proud already\". Client denies any current obstacles for his recovery and shared he feels so happy now that he is sober from alcohol and enjoys spending time with family. O-Client was pleasant, cooperative and oriented x 4. A-Client has good insight into his AoD use and consequences. He is in the action stage of change. Client has family support. Counselor has encouraged client to attend 12 step meetings. He is journaling and reports it being beneficial and writing a gratitude list every morning as well. P-Continue services.              Electronically signed by Gustavo Garza on 2023 at 10:31 AM
3-5 times weekly thoughts and feelings and share in his individual sessions in 90 days:  Evaluation Date: 1/27/2024 Code: Continue 12/1/2023 Client shared he is excited and headed to see his nephew play in a football tournament game tonight. He shared now that he is sober he is enjoying time with family. 2) Client will explore new outdoor/indoor activities that bring him estephania in 90 days Evaluation Date: 1/27/2024 Code: C Continue TBD      3)  Utilize, if needed case management services provided by [facility] to enhance abstaining from substance use Evaluation Date: 1/27/2024 Code: C Continue TBD         3. Problem: History of Relapse due to lack of sober support. Goal: Identify and address the core dynamics and dilemmas that are perpetuating consequences and exacerbate relapses and triggers and in 90 days      Objectives:   1)  Client will attend 1-2 AA/NA in person or online meetings Biweekly to avoid relapse in 90 days Evaluation Date: 1/27/2024 Code: C Continue TBD     2) Client will enhance 4 to 8 healthy techniques and coping skills, for relapse prevention in 90 days Evaluation Date: 1/27/2024 Code: C Continue TBD    3) Client will explore and resolve ambivalence associated with commitment to change behaviors related to substance use and addiction in 90 days. Evaluation Date: 1/27/2024 Code: C Continue TBD    Defer: Client would like to moved out of his mothers home. Discharge Plan/Instructions: Client will remain abstinent from all substances except his medical marijuana. Client will complete his treatment plan goals and objectives. Robby Artis / 1975 has participated in the treatment plan development outlined above on 12/1/2023.      TABATHA VieiraIII  12/1/2023/10:29 AM

## 2023-12-05 ENCOUNTER — HOSPITAL ENCOUNTER (OUTPATIENT)
Dept: PSYCHIATRY | Age: 48
Setting detail: THERAPIES SERIES
Discharge: HOME | End: 2023-12-05
Payer: COMMERCIAL

## 2023-12-05 PROCEDURE — 80305 DRUG TEST PRSMV DIR OPT OBS: CPT

## 2023-12-05 PROCEDURE — 90853 GROUP PSYCHOTHERAPY: CPT

## 2023-12-06 NOTE — GROUP NOTE
500 [facility] Group Therapy Note      12/5/2023    Location:  [place]      Clients Presents: 3964, Tania, 1524, 1531, [id number], [id number]    Clients Absent: 1043, 1373, 1532    Length of session: 1.5 hours    Group Note: OP    Group Type: Co-Ed    New members were welcomed and introduced. Norms and expectations of group were discussed. Content: Counselor presented a topic focused discussion on \"10 ways to find new motivation and rise above roadblocks\". Austin Arias [address]  12/5/2023 7:00 PM    Co-Therapist: N/A      [facility] Individual Group Progress Note    Nola Rousseau  1975 12/6/2023    Notes on Client Progress in Group    Client shared he is making progress on his treatment plan goals and objectives. Client denies any current obstacles. Client participated in group discussion on motivation.  Client shared he needs to get motivated and be more structured with his recovery program.     Austin Arias [address]  12/6/2023 9:04 AM    Co-Therapist: N/A

## 2023-12-08 ENCOUNTER — HOSPITAL ENCOUNTER (OUTPATIENT)
Dept: PSYCHIATRY | Age: 48
Discharge: HOME | End: 2023-12-08

## 2023-12-08 NOTE — PROGRESS NOTES
500 [facility]        Individual  Progress Note    Location: [x] [place] [] [facility]                   Patient Name: Nola Rousseau   : 1975     Case # :  [id number]  Therapist: CARMELA Arias        Objective/Service/Time:     Client called and left a voice message saying he had to work overtime. I called and left him a message letting him know we need to figure out an individual time that would work better.          Electronically signed by Inderjit Warren on 2023 at 10:04 AM

## 2023-12-12 ENCOUNTER — HOSPITAL ENCOUNTER (OUTPATIENT)
Dept: PSYCHIATRY | Age: 48
Setting detail: THERAPIES SERIES
Discharge: HOME | End: 2023-12-12
Payer: COMMERCIAL

## 2023-12-12 PROCEDURE — 90853 GROUP PSYCHOTHERAPY: CPT

## 2023-12-13 NOTE — GROUP NOTE
500 [facility] Group Therapy Note      12/12/2023    Location:  [place]      Clients Presents: 8815, 2879, 0729, 1531, 1509, 9699, 1043    Clients Absent: 1538    Length of session: 1.5 hours    Group Note: OP    Group Type: Co-Ed    New members were welcomed and introduced. Norms and expectations of group were discussed. Content: Counselor presented a topic focused discussion on Substances and the effects on the body. Gearlean Schwab, [address]  12/12/2023 7:00 PM    Co-Therapist: N/A      [facility] Individual Group Progress Note    Shanae Camp  1975 12/13/2023    Notes on Client Progress in Group    Client shared he is making progress on his treatment goals He shared he is moving this weekend with his wife and son to [place]. Client participated in group discussion on Substances and reports alcohol was his drug of choice and it always made hi make bad decisions. His anger was out of control.      Gearlean Schwab, [address]  12/13/2023 7:56 AM    Co-Therapist: N/A

## 2023-12-15 ENCOUNTER — HOSPITAL ENCOUNTER (OUTPATIENT)
Dept: PSYCHIATRY | Age: 48
Setting detail: THERAPIES SERIES
Discharge: HOME | End: 2023-12-15
Payer: COMMERCIAL

## 2023-12-15 NOTE — PROGRESS NOTES
[facility] TREATMENT PLAN      Location: [x] [place] [] [place]    Treatment plan: Initial    Strengths: loyal, reliable and hardworking    Weakness/Limitations: procrastination , impulsive when angry     Service/Frequency/Duration: Individual 1 time a week for 90 days, Group assigned 1 a week for 90 days, Urinalysis Random for 90 days, AA/NA 1-2 Biweekly for 90 days, and Case Management as needed for 90 days    Diagnosis: F10.20 Other and unspecified alcohol dependence/unspecified drinking behavior and F12.10 Nondependent cannabis abuse-unspecified use    Level of Care: 1 Outpatient Services      Problem: History of Substance use resulting disrupting public service. Goal: Client will enhance personalized knowledge and insight associated with mood altering substances in 90 days   Objectives:   1) Client will remind self of detrimental consequences in major life areas regarding AoD use in 90 days Evaluation Date: 1/27/2024 Code: Achieved 11/3/2023 Client reports he neglected his kids, his family, drinking created depression, he spent tons of money, and lost people and friendships. 2) Client will identify 4 to 8 benefits and gratitude's due to remaining substance free in 90 days: Evaluation Date: 1/27/2024 Code: Continue 11/17/2023 Client reports he is grateful he is seeing growth and so is his wife. 3) Client will identify 4 relapse triggers, define relapse, difference between internal and external triggers associated with substance use in 90 days and Evaluation Date: 1/27/2024 Code: Achieved 11/10/2023 Client reports Holiday's are a trigger because he misses all the family he has lost. Grief and loss is a big trigger and anger. He is using support and pausing when triggered. 2.    Problem: Low Self-Esteem/Identify issues as a result of self-medicating.       Goal: Client will learn to focus on character strengths and feel better about himself in 90 days      Objectives:   1) Client will log

## 2023-12-15 NOTE — PROGRESS NOTES
500 [facility]        Individual  Progress Note    Location: [x] [place] [facility]                   Patient Name: Paty Kunz   : 1975     Case # :  [id number]  Therapist: CARMELA Jenkins        Objective/Service/Time: kept 1 hour individual     Goal 2 Objective 2 continue  Goal 3 Objective 2 continue    S-Client is a 53year old  male on probation. Client denies any use except his medical marijuana. Client reports he is very excited to move this weekend. He stated, \"We are not pressed to have it all done this weekend. We have until the  of the year really. We are just starting today\". Client shared his coping skills to avoid relapse have been praying, pausing, deep breathing and self talk. Client shared he has been hiking and enjoying being outside since it has been eula. Client denies any current obstacles or stressors. O-Client was cooperative, pleasant and oriented x 4. A-Client has good insight into his AoD use and consequences. Client is in the action stage of change. He has good family support. Client has been encouraged to engage in 12 step meetings to build more support. Client was give 2 worksheets on anger. P-Continue services, anger worksheets, self care and build support.          Electronically signed by Gustavo Garza on 12/15/2023 at 10:45 AM

## 2023-12-26 ENCOUNTER — HOSPITAL ENCOUNTER (OUTPATIENT)
Dept: PSYCHIATRY | Age: 48
Setting detail: THERAPIES SERIES
Discharge: HOME | End: 2023-12-26
Payer: COMMERCIAL

## 2023-12-27 NOTE — GROUP NOTE
500 [facility] Group Therapy Note        12/26/2023    Location:  [place]      Clients Presents: 1734, 0864    Clients Absent: 1554, 1538, 1373, 1543, 1531, 1557, 1043, 1560    Length of session: 1.5 hours    Group Note: OP    Group Type: Co-Ed    New members were welcomed and introduced. Norms and expectations of group were discussed. Content: Counselor facilitated client check in. Counselor presented a topic focused discussion on Thinking errors. Client identified common thinking errors he/she has.      Gearlean Schwab, [address]  12/26/2023 7:00 PM    Co-Therapist: N/A      [facility] Individual Group Progress Note    Shanae Camp  1975 12/27/2023    Notes on Client Progress in Group    Reason for Absence: DNS     Gearlean Schwab, [address]  12/27/2023 7:51 AM    Co-Therapist: N/A

## 2023-12-29 ENCOUNTER — HOSPITAL ENCOUNTER (OUTPATIENT)
Dept: PSYCHIATRY | Age: 48
Discharge: HOME | End: 2023-12-29

## 2023-12-29 NOTE — PROGRESS NOTES
[facility]        Individual  Progress Note    Location: [x] [place] [] [place]                   Patient Name: Ashok Martinez   : 1975     Case # :  [id number]  Therapist: CARMELA Rowland        Objective/Service/Time:     Client called and left voice message reporting he will not be in due to having to wait for gas man to come and turn gas on. I called 2 times to try and do a telehealth session but no one answered. I left a message asking client to call [facility] if he could do a Telehealth session.         Electronically signed by CARMELA Rowland on 2023 at 10:03 AM

## 2024-01-02 ENCOUNTER — HOSPITAL ENCOUNTER (OUTPATIENT)
Dept: PSYCHIATRY | Age: 49
Setting detail: THERAPIES SERIES
Discharge: HOME | End: 2024-01-02
Payer: COMMERCIAL

## 2024-01-02 PROCEDURE — 90853 GROUP PSYCHOTHERAPY: CPT

## 2024-01-03 NOTE — GROUP NOTE
[facility] Group Therapy Note      1/2/2024    Location:  [place]      Clients Presents: 1532, 1543, 1531, 9699, 1043, 1560    Clients Absent: 1557, 1512, 1538, 1373    Length of session: 1.5 hours    Group Note: OP    Group Type: Co-Ed    New members were welcomed and introduced.  Norms and expectations of group were discussed.    Content: Counselor presented a topic focused discussion on setting and achieving goals. Client created a goal and set steps in order to achieved set goal.     CARMELA Rowland  1/2/2024 7:02 PM    Co-Therapist: N/A      [facility] Individual Group Progress Note    Ashok Martinez  1975  1/3/2024    Notes on Client Progress in Group    Client shared he is making progress on his goals of staying sober and making his marriage work. He is still moving in with his family and it is taking longer than expected. Client set a goal of taking a family vacation this year.     CARMELA Rowland  1/3/2024 7:39 AM    Co-Therapist: N/A

## 2024-01-05 ENCOUNTER — HOSPITAL ENCOUNTER (OUTPATIENT)
Dept: PSYCHIATRY | Age: 49
Setting detail: THERAPIES SERIES
Discharge: HOME | End: 2024-01-05
Payer: COMMERCIAL

## 2024-01-05 PROCEDURE — 90834 PSYTX W PT 45 MINUTES: CPT

## 2024-01-05 NOTE — PROGRESS NOTES
[facility] TREATMENT PLAN      Location: [x] [place] [] [place]    Treatment plan: Initial    Strengths: loyal, reliable and hardworking    Weakness/Limitations: procrastination , impulsive when angry     Service/Frequency/Duration: Individual 1 time a week for 90 days, Group assigned 1 a week for 90 days, Urinalysis Random for 90 days, AA/NA 1-2 Biweekly for 90 days, and Case Management as needed for 90 days    Diagnosis: F10.20 Other and unspecified alcohol dependence/unspecified drinking behavior and F12.10 Nondependent cannabis abuse-unspecified use    Level of Care: 1 Outpatient Services      Problem: History of Substance use resulting disrupting public service.     Goal: Client will enhance personalized knowledge and insight associated with mood altering substances in 90 days   Objectives:   1) Client will remind self of detrimental consequences in major life areas regarding AoD use in 90 days Evaluation Date: 1/27/2024 Code: Achieved 11/3/2023 Client reports he neglected his kids, his family, drinking created depression, he spent tons of money, and lost people and friendships.       2) Client will identify 4 to 8 benefits and gratitude's due to remaining substance free in 90 days: Evaluation Date: 1/27/2024 Code: Continue 11/17/2023 Client reports he is grateful he is seeing growth and so is his wife.      3) Client will identify 4 relapse triggers, define relapse, difference between internal and external triggers associated with substance use in 90 days and Evaluation Date: 1/27/2024 Code: Achieved 11/10/2023 Client reports Holiday's are a trigger because he misses all the family he has lost. Grief and loss is a big trigger and anger. He is using support and pausing when triggered.      2.    Problem: Low Self-Esteem/Identify issues as a result of self-medicating.      Goal: Client will learn to focus on character strengths and feel better about himself in 90 days      Objectives:   1) Client will log

## 2024-01-05 NOTE — PROGRESS NOTES
[facility]        Individual  Progress Note    Location: [x] [place] [] [place]                   Patient Name: Ashok Martinez   : 1975     Case # :  [id number]  Therapist: CARMELA Rowland        Objective/Service/Time: kept 1 hour individual     Goal 2 Objective 2 continue  Goal 3 Objective 2 continue    S-Client is a 48 year old  male on probation. Client denies any use except his medical marijuana. Client shared he and his wife got all moved in to their new home and are settling in fine. He reports his son is not changing schools until next year. Client shared his New Years was quiet and he was in bed by 9:00 PM. Counselor explored coping skills to avoid relapse. Client stated, \"I am really trying to stay in shape so I am exercising. I am using positive self talk a lot and practicing the pause. We are still going to marriage counseling too\". Client denies any current obstacles or stressors.     O-Client was cooperative, pleasant and oriented x 4. He shared his thoughts and feelings openly.     A-Client has good insight into his AoD use and consequences. He is in the action stage of change. Client has good sober support within his family but needs to build more within friend group. He has been encouraged to attend 12 step meetings.     P-Continue services and attend 12 step meetings.         Electronically signed by CARMELA Rowland on 2024 at 9:18 AM    Retention Suture Bite Size: 3 mm

## 2024-01-09 ENCOUNTER — HOSPITAL ENCOUNTER (OUTPATIENT)
Dept: PSYCHIATRY | Age: 49
Setting detail: THERAPIES SERIES
Discharge: HOME | End: 2024-01-09
Payer: COMMERCIAL

## 2024-01-09 PROCEDURE — 90853 GROUP PSYCHOTHERAPY: CPT

## 2024-01-10 NOTE — GROUP NOTE
[facility] Group Therapy Note      1/9/2024    Location:  [place]      Clients Presents: 1373, 1538, 1560, 1543, 1531, 9699, 1043    Clients Absent: 1532, 1557    Length of session: 1.5 hours    Group Note: OP    Group Type: Co-Ed    New members were welcomed and introduced.  Norms and expectations of group were discussed.    Content: Counselor presented a Relapse prevention plan worksheet. Client filled out a relapse prevention plan focusing on triggers, coping skills, sober support, reasons for remaining sober and reasons for not relapsing.     CARMELA Rowland  1/9/2024 7:00 PM    Co-Therapist: N/A      [facility] Individual Group Progress Note    Ashok Martinez  1975  1/10/2024    Notes on Client Progress in Group    Client reports he is making good progress on his treatment goals. He is happy in his new place but still needs to get it all unpacked.   Client filled out relapse prevention plan and reports he would like to build more sober support.      CARMELA Rowland  1/10/2024 7:41 AM    Co-Therapist: N/A

## 2024-01-16 ENCOUNTER — HOSPITAL ENCOUNTER (OUTPATIENT)
Dept: PSYCHIATRY | Age: 49
Setting detail: THERAPIES SERIES
Discharge: HOME | End: 2024-01-16
Payer: COMMERCIAL

## 2024-01-17 NOTE — GROUP NOTE
[facility] Group Therapy Note      1/16/2024    Location:  [place]      Clients Presents: 1538, 1532, 1543, 1043    Clients Absent: 1373, 1560, 1531, 9699    Length of session: 1.5 hours    Group Note: OP    Group Type: Co-Ed    New members were welcomed and introduced.  Norms and expectations of group were discussed.    Content: Counselor presented a topic focused discussion on the effects of using. Client identified areas of his/her life that were most effected by substance use, areas that have improved since becoming sober and areas that still need work.     CARMELA Rowland  1/16/2024 7:00 PM    Co-Therapist: N/A      [facility] Individual Group Progress Note    Ashok Martinez  1975 1/17/2024    Notes on Client Progress in Group    Reason for Absence: cancel due to working    CARMELA Rowland  1/17/2024 7:42 AM    Co-Therapist: N/A

## 2024-01-19 ENCOUNTER — HOSPITAL ENCOUNTER (OUTPATIENT)
Dept: PSYCHIATRY | Age: 49
Discharge: HOME | End: 2024-01-19

## 2024-01-19 NOTE — PROGRESS NOTES
[facility]        Individual  Progress Note    Location: [x] [place] [] [place]                   Patient Name: Ashok Martinez   : 1975     Case # :  [id number]  Therapist: CARMELA Rowland        Objective/Service/Time:     Client did not show for his session. Counselor called and left voice message. Counselor sent a letter of intent.         Electronically signed by CARMELA Rowland on 2024 at 10:32 AM

## 2024-01-23 ENCOUNTER — HOSPITAL ENCOUNTER (OUTPATIENT)
Dept: PSYCHIATRY | Age: 49
Setting detail: THERAPIES SERIES
Discharge: HOME | End: 2024-01-23
Payer: COMMERCIAL

## 2024-01-23 PROCEDURE — 90853 GROUP PSYCHOTHERAPY: CPT

## 2024-01-24 NOTE — GROUP NOTE
[facility] Group Therapy Note      1/23/2024    Location:  [place]      Clients Presents: 9699, 1561, 1373, 1538, 1560, 1543, 1531    Clients Absent: 1043    Length of session: 1.5 hours    Group Note: OP    Group Type: Co-Ed    New members were welcomed and introduced.  Norms and expectations of group were discussed.    Content: Counselor presented a topic focused discussion on \"Feelings\". Client identified his/her feelings when they arrived in treatment at [facility], feelings he/she feels most of the time, and feelings that he/she is feeling today.     SHANNA RowlandDCIII  1/23/2024 7:00 PM    Co-Therapist: N/A      [facility] Individual Group Progress Note    Ashok Martinez  1975 1/24/2024    Notes on Client Progress in Group    Client shared he is making progress on his treatment goals. He is only using his medical marijuana. Client reports his stressor is an argument he had with his mother.   Client participated in group discussion on feelings. He reports when he first got to [facility] he felt anxious, angry and lots of grief. Now he feels hungry, tired and content.     TABATHA RowlandIII  1/24/2024 9:00 AM    Co-Therapist: N/A

## 2024-01-26 ENCOUNTER — HOSPITAL ENCOUNTER (OUTPATIENT)
Dept: PSYCHIATRY | Age: 49
Discharge: HOME | End: 2024-01-26

## 2024-01-26 NOTE — PROGRESS NOTES
[facility]        Individual  Progress Note    Location: [x] [place] [] [place]                   Patient Name: Ashok Martinez   : 1975     Case # :  [id number]  Therapist: CARMELA Rowland        Objective/Service/Time:     Counselor cancelled this client's individual session due to covering IOP this morning. Counselor offered an 8:00 AM individual session but client declined due to work schedule.         Electronically signed by CARMELA Rowland on 2024 at 8:10 AM

## 2024-01-30 ENCOUNTER — APPOINTMENT (OUTPATIENT)
Dept: PSYCHIATRY | Age: 49
End: 2024-01-30
Payer: COMMERCIAL

## 2024-01-30 PROCEDURE — 90853 GROUP PSYCHOTHERAPY: CPT

## 2024-01-31 NOTE — GROUP NOTE
[facility] Group Therapy Note      1/30/2024    Location:  [place]      Clients Presents: 1560, 1561, 1043, 9699, 1538, 1373, 1543, 1531    Clients Absent:     Length of session: 1.5 hours    Group Note: OP    Group Type: Co-Ed    New members were welcomed and introduced.  Norms and expectations of group were discussed.    Content: Counselor presented part two of \"feelings\" group. Client identified difficult feelings he/she experiences and how he/she usually reacts. Client shared on learning how to manage emotions can change his/her behavior in positive ways.     TABATHA RowlandIII  1/30/2024 7:00 PM    Co-Therapist: N/A      [facility] Individual Group Progress Note    Ashok Martinez  1975 1/31/2024    Notes on Client Progress in Group    Client shared he is making progress on his goals in treatment. Client shared things are still the same at home and work. He reports still using his medical marijuana daily.   Client participated in group discussion on difficult feelings reporting anger with his mother is difficult. Client shared he raised his voice and was disappointed in himself.         TABATHA RowlandIII  1/31/2024 9:50 AM    Co-Therapist: N/A

## 2024-02-02 ENCOUNTER — HOSPITAL ENCOUNTER (OUTPATIENT)
Dept: PSYCHIATRY | Age: 49
Setting detail: THERAPIES SERIES
Discharge: HOME | End: 2024-02-02
Payer: COMMERCIAL

## 2024-02-02 PROCEDURE — 90834 PSYTX W PT 45 MINUTES: CPT

## 2024-02-02 PROCEDURE — 80305 DRUG TEST PRSMV DIR OPT OBS: CPT

## 2024-02-02 NOTE — PROGRESS NOTES
[facility]        Individual  Progress Note    Location: [x] [place] [] [place]                   Patient Name: Ashok Martinez   : 1975     Case # :  [id number]  Therapist: CARMELA Rowland        Objective/Service/Time: kept 1 hour individual    Goal 2 Objective 1 achieved  Goal 3 Objective 3 achieved    S-Client is a 48 year old  male on probation. Client denies any use except his medical marijuana. Client shared he is feeling horrible today stating, \"I am sweating and then I am cold. I know I have a fever. I tested for COVID and it was negative so theres that. I know I needed to get in here so I can finish\". Client denies any current obstacles or stressors for his recovery. He shared he is still going to marriage counseling with his wife 1 time a month and all is well. He reports his wife is now sober and does not drink. He stated, \"It is great. She is so supportive\". Counselor explored client's thoughts about staying sober for life. Client stated, \"I will not drink alcohol. It is poison to my body. I can't predict what will happen when I drink so I am good without it. But marijuana? I will continue to use for my PTSD. I don't see myself not using it\".     O-Client was cooperative, pleasant and oriented x 4. He was sweating throughout session and was visibly pale.     A-Client has good insight into his AoD use and consequences. He has good support and is in the action stage of change. Client has the [employer] antoinette on his phone and has set a goal to attend a meeting over the weekend. Counselor suggested AA, Co-dependent and [facility] meetings.     P-Continue services, 12 step meetings and self care.         Electronically signed by CARMELA Rowland on 2024 at 10:47 AM

## 2024-02-02 NOTE — PROGRESS NOTES
[facility] TREATMENT PLAN        Location: [x] [place] [] [place]     Treatment plan: Initial     Strengths: loyal, reliable and hardworking     Weakness/Limitations: procrastination , impulsive when angry      Service/Frequency/Duration: Individual 1 time a week for 90 days, Group assigned 1 a week for 90 days, Urinalysis Random for 90 days, AA/NA 1-2 Biweekly for 90 days, and Case Management as needed for 90 days     Diagnosis: F10.20 Other and unspecified alcohol dependence/unspecified drinking behavior and F12.10 Nondependent cannabis abuse-unspecified use     Level of Care: 1 Outpatient Services        Problem: History of Substance use resulting disrupting public service.     Goal: Client will enhance personalized knowledge and insight associated with mood altering substances in 90 days   Objectives:   1) Client will remind self of detrimental consequences in major life areas regarding AoD use in 90 days Evaluation Date: 1/27/2024 Code: Achieved 11/3/2023 Client reports he neglected his kids, his family, drinking created depression, he spent tons of money, and lost people and friendships.       2) Client will identify 4 to 8 benefits and gratitude's due to remaining substance free in 90 days: Evaluation Date: 1/27/2024 Code: Continue 11/17/2023 Client reports he is grateful he is seeing growth and so is his wife.      3) Client will identify 4 relapse triggers, define relapse, difference between internal and external triggers associated with substance use in 90 days and Evaluation Date: 1/27/2024 Code: Achieved 11/10/2023 Client reports Holiday's are a trigger because he misses all the family he has lost. Grief and loss is a big trigger and anger. He is using support and pausing when triggered.      2.    Problem: Low Self-Esteem/Identify issues as a result of self-medicating.      Goal: Client will learn to focus on character strengths and

## 2024-02-06 ENCOUNTER — HOSPITAL ENCOUNTER (OUTPATIENT)
Dept: PSYCHIATRY | Age: 49
Setting detail: THERAPIES SERIES
Discharge: HOME | End: 2024-02-06
Payer: COMMERCIAL

## 2024-02-06 ENCOUNTER — APPOINTMENT (OUTPATIENT)
Dept: PSYCHIATRY | Age: 49
End: 2024-02-06
Payer: COMMERCIAL

## 2024-02-06 PROCEDURE — 90853 GROUP PSYCHOTHERAPY: CPT

## 2024-02-07 NOTE — GROUP NOTE
[facility] Group Therapy Note      2/6/2024    Location:  [place]      Clients Presents: 1531, 1577, 1543, 9699, 1578, 1561    Clients Absent: 1538, 1560, 1043    Length of session: 1.5 hours    Group Note: OP    Group Type: Co-Ed    New members were welcomed and introduced.  Norms and expectations of group were discussed.    Content: Client identified grief and loss feelings and coping skills to manage grief and loss feelings to avoid relapse.     CARMELA Rowland  2/6/2024 7:00 PM    Co-Therapist: N/A      [facility] Individual Group Progress Note    Ashok Martinez  1975 2/7/2024    Notes on Client Progress in Group    Client shared he is making progress on his goals in treatment. Client shared he is still recovering from the flu he had over the weekend.   Client participated in group discussion on grief and loss reporting he feels lot of grief everyday. He shared he breaks down in car usually to work in the morning and process his feelings. He refuses to cry in front of his family, reporting \"it would be weak\". Client shared he prays and has vasile. He is doing a good job processing grief and understands \"it is a journey\".     CARMELA Rowland  2/7/2024 7:57 AM    Co-Therapist: N/A

## 2024-02-09 ENCOUNTER — HOSPITAL ENCOUNTER (OUTPATIENT)
Dept: PSYCHIATRY | Age: 49
Setting detail: THERAPIES SERIES
Discharge: HOME | End: 2024-02-09
Payer: COMMERCIAL

## 2024-02-09 PROCEDURE — 90834 PSYTX W PT 45 MINUTES: CPT

## 2024-02-09 NOTE — PROGRESS NOTES
[facility] Discharge Treatment Plan    Ashoklex Martinez  1975  Case # [id number]    Location: [x] [place] [] [place]    A. Stresses that I need to monitor  1.  Finances   2.  Relationship   3.  Children   4.     B. Major triggers to using alcohol/drugs   1.  Relationship   2.  Grief and loss  3.      Sober Plan   1. Counseling    2.  Working out   3. Banks and meditate     C. Sobriety Support   1. Friend: none identified    2. Treatment staff: Marina    3. Family member:  wife   4. AA/NA recovery program, sponsor, meetings day/times, daily ready: online support literature     D. Non-using activities  1.  Exercise   2.  Golfing   3. Basketball     E. Consequences of Drug/Alcohol use  1.  Destroyed relationships   2.  Hurt myself   3. Disappointing people I love     G. Short term goals to achieve  1.  Go back to school   2.  Improve relationship with his mother    H. Follow up recommendations  1. Client is encouraged to continue to remain abstinent from all mood altering substances.    2. Client is encouraged to continue with marriage counseling.     Ashok R Martinez / 1975 has participated in the discharge treatment plan development outlined above on 2/9/2024.     Marina Morales LCDCIII  2/9/2024/8:03 AM

## 2024-02-09 NOTE — PROGRESS NOTES
[facility] TREATMENT PLAN        Location: [x] [place] [] [place]     Treatment plan: Initial     Strengths: loyal, reliable and hardworking     Weakness/Limitations: procrastination , impulsive when angry      Service/Frequency/Duration: Individual 1 time a week for 90 days, Group assigned 1 a week for 90 days, Urinalysis Random for 90 days, AA/NA 1-2 Biweekly for 90 days, and Case Management as needed for 90 days     Diagnosis: F10.20 Other and unspecified alcohol dependence/unspecified drinking behavior and F12.10 Nondependent cannabis abuse-unspecified use     Level of Care: 1 Outpatient Services        Problem: History of Substance use resulting disrupting public service.     Goal: Client will enhance personalized knowledge and insight associated with mood altering substances in 90 days   Objectives:   1) Client will remind self of detrimental consequences in major life areas regarding AoD use in 90 days Evaluation Date: 1/27/2024 Code: Achieved 11/3/2023 Client reports he neglected his kids, his family, drinking created depression, he spent tons of money, and lost people and friendships.       2) Client will identify 4 to 8 benefits and gratitude's due to remaining substance free in 90 days: Evaluation Date: 2/27/2024 Code: Achieved 2/9/2024 Client reports he is grateful he has a good job, healthy family and a home.      3) Client will identify 4 relapse triggers, define relapse, difference between internal and external triggers associated with substance use in 90 days and Evaluation Date: 1/27/2024 Code: Achieved 11/10/2023 Client reports Holiday's are a trigger because he misses all the family he has lost. Grief and loss is a big trigger and anger. He is using support and pausing when triggered.      2.    Problem: Low Self-Esteem/Identify issues as a result of self-medicating.      Goal: Client will learn to focus on character strengths

## 2024-02-09 NOTE — PROGRESS NOTES
[facility] Discharge Summary    Ashok Martinez  1975  Case # [id number]    Location: [x] [place] [] [place]    Admission Date: 10/20/2023    Date of last service: 2/9/2024    Therapist: CARMELA Rowland     Presenting Problem  Client was charged with [employer] service March 2022 and is on probation. Client has Medical marijuana card  Last drug screen: 2/2/2024 Results: THC 1793 ng    Diagnosis: F10.20 Other and unspecified alcohol dependence/unspecified drinking behavior and F12.10 Nondependent cannabis abuse-unspecified use         Service:   assessment,   Individual 1 a week , Group assigned 1 a week , Urinalysis Random monthly , AA/NA 1-2 biweekly , and Case Management as needed     Level of care at ADMISSIONS: 1 Outpatient Services    Level of care at DISCHARGE : 1 Outpatient Services    Client's Outcomes Treatment: (Review of participation, successes, insight, etc.)   Client completed most of his treatment plan goals. He continued to use his medical marijuana and has reached maximum benefit.     Service History Appointments: 30 Scheduled 23 Kept 5 Cancelled 2 Did not show    Discharge Code: C partial completion maximum benefit    Reason for discharge: Client continued to use medical marijuana prescription and has reached maximum benefit.     Recommendations/Referrals: Client is being referred back to probation. He is encouraged to remain abstinent from all mood altering substances and remain compliant with medical marijuana. Client is encouraged to contact [facility] if he needs help in the future.     Upon involuntary termination from service, client has received Client Rights as to their right to file an appeal.    Adult/Adolescent Discharge  Level of Care Criteria to be completed separately see attached form.       CARMELA Rowland   2/9/2024 / 8:27 AM       Medical Director     LISA Stephenson MD    Signature:

## 2024-02-13 ENCOUNTER — APPOINTMENT (OUTPATIENT)
Dept: PSYCHIATRY | Age: 49
End: 2024-02-13
Payer: COMMERCIAL

## 2024-02-20 ENCOUNTER — APPOINTMENT (OUTPATIENT)
Dept: PSYCHIATRY | Age: 49
End: 2024-02-20
Payer: COMMERCIAL

## 2024-02-27 ENCOUNTER — APPOINTMENT (OUTPATIENT)
Dept: PSYCHIATRY | Age: 49
End: 2024-02-27
Payer: COMMERCIAL

## 2024-06-20 NOTE — ED PROVIDER NOTES
Conjuntivae and eyelids appear normal, Sclerae : White without injection Emergency [facility] DEPARTMENT    Patient: Jennifer Guadalupe  MRN: [id number]  : 1975  Date of Evaluation: 10/18/2018  ED Provider: Eliseo Duverney, MD    Chief Complaint       Chief Complaint   Patient presents with    Dental Pain     Having right sided facial swelling due to having right dental pain and possible abscess per patient. Delmi Carreon is a 44 y.o. male who presents to the emergency department With report that he has had pain in the right lower jaw swelling since last night. He has not had fever. The patient reports pain especially with chewing and eating. He does have a dentist although he has not made an appointment to follow-up at this time. ROS:     At least 10 systems reviewed and otherwise acutely negative except as in the [address] Past History   History reviewed. No pertinent past medical history. Past Surgical History:   Procedure Laterality Date    BACK SURGERY      L3 & L6     Social History     Social History    Marital status:      Spouse name: N/A    Number of children: N/A    Years of education: N/A     Social History Main Topics    Smoking status: Never Smoker    Smokeless tobacco: Current User     Types: Chew    Alcohol use 0.0 oz/week      Comment: social    Drug use: No    Sexual activity: Yes     Other Topics Concern    None     Social History Narrative    None       Medications/Allergies     Discharge Medication List as of 10/19/2018 12:13 AM        No Known Allergies     Physical Exam       ED Triage Vitals [10/18/18 2201]   BP Temp Temp Source Pulse Resp SpO2 Height Weight   127/84 97.1 °F (36.2 °C) Oral 79 18 95 % 6' 4\" (1.93 m) 220 lb (99.8 kg)     GENERAL APPEARANCE: Awake and alert. Cooperative. No acute distress. No obvious discomfort. HEAD: Normocephalic. Atraumatic. EYES: Sclera anicteric. ENT: Tolerates saliva. No trismus. Mild swelling at the right lower jaw.   There is no Final Impression      1. Pain, dental    2. Pain due to dental caries    3.  Dental abscess      DISPOSITION Decision To Discharge 10/19/2018 12:03:14 AM     (Please note that portions of this note may have been completed with a voice recognition program. Efforts were made to edit the dictations but occasionally words are mis-transcribed.)    Gael Vazquez MD  [address] [place] [address]     Ras Mena MD  10/19/18 1056